# Patient Record
Sex: FEMALE | Race: WHITE | NOT HISPANIC OR LATINO | Employment: FULL TIME | URBAN - NONMETROPOLITAN AREA
[De-identification: names, ages, dates, MRNs, and addresses within clinical notes are randomized per-mention and may not be internally consistent; named-entity substitution may affect disease eponyms.]

---

## 2021-03-19 PROBLEM — R06.00 DYSPNEA: Status: ACTIVE | Noted: 2021-03-19

## 2021-03-19 PROBLEM — R00.0 TACHYCARDIA: Status: ACTIVE | Noted: 2021-03-19

## 2021-03-19 PROBLEM — R00.2 PALPITATIONS: Status: ACTIVE | Noted: 2021-03-19

## 2021-03-19 PROBLEM — R53.83 FATIGUE: Status: ACTIVE | Noted: 2021-03-19

## 2021-03-19 PROBLEM — I10 ESSENTIAL HYPERTENSION: Status: ACTIVE | Noted: 2021-03-19

## 2021-03-19 NOTE — PROGRESS NOTES
OFFICE VISIT  NOTE  [facility] CARDIOLOGY      Name: Zachary Adkins    Date: 3/22/2021  MRN:  [id number]  :  1970      REFERRING/PRIMARY PROVIDER:  Leighton Crowley MD    Chief Complaint   Patient presents with   • Chest Pain     Consult    • Shortness of Breath   • Irregular Heart Beat       HPI: Zachary Adkins is a 50 y.o. female who presents today for new consultation for tachycardia and palpitations.  History of congenital single kidney.  she also reports chest pain shortness of breath.  Palpitations occur daily, usually worse at night when laying down, occasionally associated with dizziness and shortness of breath, somewhat better on bisoprolol.  Also reports chest pain shortness of breath, chest pain worse with exertion, but random, can occur at rest as well, associate with shortness of breath.  She cannot walk as far she used to without becoming short of breath.    Past Medical History:   Diagnosis Date   • Arthritis    • Hypertension    • Kidney disorder     Born with 1 good kidney        History reviewed. No pertinent surgical history.    Social History     Socioeconomic History   • Marital status: Single     Spouse name: Not on file   • Number of children: Not on file   • Years of education: Not on file   • Highest education level: Not on file   Tobacco Use   • Smoking status: Never Smoker   • Smokeless tobacco: Never Used   Vaping Use   • Vaping Use: Never used   Substance and Sexual Activity   • Alcohol use: Not Currently   • Drug use: Never   • Sexual activity: Defer       Family History   Problem Relation Age of Onset   • No Known Problems Mother    • Lung cancer Father         ROS:   Constitutional no fever,  no weight loss   Skin no rash, no subcutaneous nodules   Otolaryngeal no difficulty swallowing   Cardiovascular See HPI   Pulmonary no cough, no sputum production   Gastrointestinal no constipation, no diarrhea   Genitourinary no dysuria, no hematuria   Hematologic no easy  "bruisability, no abnormal bleeding   Musculoskeletal no muscle pain   Neurologic no dizziness, no falls         No Known Allergies      Current Outpatient Medications:   •  Bilberry 150 MG capsule, Take  by mouth Daily., Disp: , Rfl:   •  bisoprolol (ZEBeta) 5 MG tablet, 0.5 tablets Daily., Disp: , Rfl:   •  Flaxseed Oil oil, Daily., Disp: , Rfl:   •  Mily, Zingiber officinalis, (MILY ROOT PO), Take  by mouth Daily., Disp: , Rfl:   •  Omega-3 Fatty Acids (fish oil) 1000 MG capsule capsule, Take 1,000 mg by mouth Daily With Breakfast., Disp: , Rfl:   •  Probiotic Product (PROBIOTIC-10 PO), Take  by mouth Daily., Disp: , Rfl:   •  Turmeric 450 MG capsule, Take 2 tablets by mouth Daily., Disp: , Rfl:     Vitals:    03/22/21 1458   BP: 122/72   BP Location: Left arm   Patient Position: Sitting   Pulse: 74   SpO2: 98%   Weight: 73.5 kg (162 lb)   Height: 152.4 cm (60\")     Body mass index is 31.64 kg/m².    PHYSICAL EXAM:    General Appearance:   · well developed  · well nourished  HENT:   · oropharynx moist  · lips not cyanotic  Neck:  · thyroid not enlarged  · supple  Respiratory:  · no respiratory distress  · normal breath sounds  · no rales  Cardiovascular:  · no jugular venous distention  · regular rhythm  · apical impulse normal  · S1 normal, S2 normal  · no S3, no S4   · no murmur  · no rub, no thrill  · carotid pulses normal; no bruit  · pedal pulses normal  · lower extremity edema: none    Gastrointestinal:   · bowel sounds normal  · non-tender  · no hepatomegaly, no splenomegaly  Musculoskeletal:  · no clubbing of fingers.   · normocephalic, head atraumatic  Skin:   · warm, dry  Psychiatric:  · judgement and insight appropriate  · normal mood and affect    RESULTS:     ECG 12 Lead    Date/Time: 3/22/2021 3:23 PM  Performed by: Jose Luis King MD  Authorized by: Jose Luis King MD   Comparison: not compared with previous ECG   Previous ECG: no previous ECG available  Rhythm: sinus rhythm  Rate: " normal  QRS axis: normal    Clinical impression: non-specific ECG                  Labs:  No results found for: CHOL, TRIG, HDL, LDL, AST, ALT  No results found for: HGBA1C  No components found for: CREATINININE  No results found for: EGFRIFNONA    Most recent PCP note, imaging tests, and labs reviewed.    ASSESSMENT:  Problem List Items Addressed This Visit        Cardiac and Vasculature    Tachycardia - Primary    Palpitations    Essential hypertension    Relevant Medications    bisoprolol (ZEBeta) 5 MG tablet    Precordial chest pain       Symptoms and Signs    Dyspnea    Fatigue           PLAN:    1.  Chest pain:  Proceed with treadmill exercise stress test.   The patient was advised to call 911 if chest pain worsens or persist despite nitroglycerin or rest.    2.  Palpitations:  14 day holter to be sent to pt's home.  Decrease caffeine, increase water, increase exercise.    3.  Dyspnea  Check echo and gxt.      Return to clinic in 6 months, or sooner as needed.    Thank you for the opportunity to share in the care of your patient; please do not hesitate to call me with any questions.     Jose Luis King MD, [facility]  Office: [phone] [address] [place] [address]    03/22/21

## 2021-03-22 ENCOUNTER — CONSULT (OUTPATIENT)
Dept: CARDIOLOGY | Facility: CLINIC | Age: 51
End: 2021-03-22

## 2021-03-22 VITALS
HEART RATE: 74 BPM | WEIGHT: 162 LBS | DIASTOLIC BLOOD PRESSURE: 72 MMHG | BODY MASS INDEX: 31.8 KG/M2 | SYSTOLIC BLOOD PRESSURE: 122 MMHG | HEIGHT: 60 IN | OXYGEN SATURATION: 98 %

## 2021-03-22 DIAGNOSIS — R00.2 PALPITATIONS: ICD-10-CM

## 2021-03-22 DIAGNOSIS — R00.0 TACHYCARDIA: Primary | ICD-10-CM

## 2021-03-22 DIAGNOSIS — I10 ESSENTIAL HYPERTENSION: ICD-10-CM

## 2021-03-22 DIAGNOSIS — R06.00 DYSPNEA, UNSPECIFIED TYPE: ICD-10-CM

## 2021-03-22 DIAGNOSIS — R07.2 PRECORDIAL CHEST PAIN: ICD-10-CM

## 2021-03-22 DIAGNOSIS — R53.83 OTHER FATIGUE: ICD-10-CM

## 2021-03-22 PROCEDURE — 99204 OFFICE O/P NEW MOD 45 MIN: CPT | Performed by: INTERNAL MEDICINE

## 2021-03-22 PROCEDURE — 93000 ELECTROCARDIOGRAM COMPLETE: CPT | Performed by: INTERNAL MEDICINE

## 2021-03-22 RX ORDER — CHLORAL HYDRATE 500 MG
1000 CAPSULE ORAL
COMMUNITY
End: 2023-04-05

## 2021-03-22 RX ORDER — BISOPROLOL FUMARATE 5 MG/1
0.5 TABLET, FILM COATED ORAL DAILY
COMMUNITY
Start: 2021-02-24 | End: 2022-05-03

## 2021-03-22 RX ORDER — BILBERRY 150 MG
CAPSULE ORAL DAILY
COMMUNITY
End: 2023-04-05

## 2021-04-07 ENCOUNTER — TELEPHONE (OUTPATIENT)
Dept: CARDIOLOGY | Facility: CLINIC | Age: 51
End: 2021-04-07

## 2021-04-07 NOTE — TELEPHONE ENCOUNTER
Jaye called to inform us that pt declined to proceed with heart monitor. Attempted to reach pt, no answer, no VM.

## 2022-05-03 RX ORDER — CYCLOBENZAPRINE HCL 10 MG
TABLET ORAL
Qty: 30 TABLET | Refills: 1 | Status: SHIPPED | OUTPATIENT
Start: 2022-05-03 | End: 2022-06-27 | Stop reason: SDUPTHER

## 2022-05-03 RX ORDER — BISOPROLOL FUMARATE 5 MG/1
TABLET, FILM COATED ORAL
Qty: 30 TABLET | Refills: 1 | Status: SHIPPED | OUTPATIENT
Start: 2022-05-03 | End: 2022-06-27 | Stop reason: SDUPTHER

## 2022-05-03 NOTE — TELEPHONE ENCOUNTER
Sending in enough meds to hold her over to her appointment with dr. Goodwin. Patient's last appointment was in dec for acute visit, prior to dec, last appointment in feb 2021

## 2022-06-27 ENCOUNTER — OFFICE VISIT (OUTPATIENT)
Dept: FAMILY MEDICINE CLINIC | Facility: CLINIC | Age: 52
End: 2022-06-27

## 2022-06-27 VITALS
HEIGHT: 60 IN | OXYGEN SATURATION: 99 % | DIASTOLIC BLOOD PRESSURE: 80 MMHG | HEART RATE: 68 BPM | WEIGHT: 170 LBS | SYSTOLIC BLOOD PRESSURE: 128 MMHG | BODY MASS INDEX: 33.38 KG/M2

## 2022-06-27 DIAGNOSIS — Z00.00 ROUTINE GENERAL MEDICAL EXAMINATION AT A HEALTH CARE FACILITY: Primary | ICD-10-CM

## 2022-06-27 DIAGNOSIS — E55.9 VITAMIN D DEFICIENCY: ICD-10-CM

## 2022-06-27 DIAGNOSIS — R51.9 FREQUENT HEADACHES: ICD-10-CM

## 2022-06-27 DIAGNOSIS — I10 ESSENTIAL HYPERTENSION: ICD-10-CM

## 2022-06-27 PROCEDURE — 36415 COLL VENOUS BLD VENIPUNCTURE: CPT | Performed by: FAMILY MEDICINE

## 2022-06-27 PROCEDURE — 99386 PREV VISIT NEW AGE 40-64: CPT | Performed by: FAMILY MEDICINE

## 2022-06-27 RX ORDER — CYCLOBENZAPRINE HCL 10 MG
10 TABLET ORAL NIGHTLY
Qty: 30 TABLET | Refills: 5 | Status: SHIPPED | OUTPATIENT
Start: 2022-06-27

## 2022-06-27 RX ORDER — BISOPROLOL FUMARATE 5 MG/1
5 TABLET, FILM COATED ORAL DAILY
Qty: 30 TABLET | Refills: 5 | Status: SHIPPED | OUTPATIENT
Start: 2022-06-27

## 2022-06-27 NOTE — PROGRESS NOTES
Female Physical Note      Date: 2022   Patient Name: Zachary Adkins  : 1970   MRN: [id number]     Chief Complaint:    Chief Complaint   Patient presents with   • Annual Exam       History of Present Illness: Zachary Adkins is a 51 y.o. female who is here today for their annual health maintenance and physical.  Overall patient feeling well.  No significant complaints.  Appropriate health maintenance was discussed.  Medications were reviewed.  Medications stable.        Subjective      Review of Systems:   Review of Systems   Constitutional: Negative for fatigue and fever.   HENT: Negative for congestion and ear pain.    Respiratory: Negative for apnea, cough, chest tightness and shortness of breath.    Cardiovascular: Negative for chest pain.   Gastrointestinal: Negative for abdominal pain, constipation, diarrhea and nausea.   Musculoskeletal: Negative for arthralgias.   Psychiatric/Behavioral: Negative for depressed mood and stress.       Past Medical History:   Past Medical History:   Diagnosis Date   • Arthritis    • Breast lump     RIGHT   • Common migraine    • Elevated blood pressure reading    • Fracture     RT 5TH [facility] FRX   • Hypertension    • Kidney disorder     Born with 1 good kidney        Past Surgical History:   Past Surgical History:   Procedure Laterality Date   • BREAST BIOPSY Right    • ORIF FINGER FRACTURE     • TUBAL ABDOMINAL LIGATION Bilateral        Family History:   Family History   Problem Relation Age of Onset   • Cancer Mother    • Lung cancer Father    • Allergies Brother    • Lupus Paternal Grandmother 65   • Diabetes Other    • Depression Other    • Cancer Other    • Migraines Other    • Eczema Other    • Allergies Other        Social History:   Social History     Socioeconomic History   • Marital status: Single   Tobacco Use   • Smoking status: Never Smoker   • Smokeless tobacco: Never Used   Vaping Use   • Vaping Use: Never used   Substance and Sexual Activity   •  "Alcohol use: Not Currently   • Drug use: Never   • Sexual activity: Defer       Medications:     Current Outpatient Medications:   •  bisoprolol (ZEBeta) 5 MG tablet, Take 1 tablet by mouth Daily., Disp: 30 tablet, Rfl: 5  •  cyclobenzaprine (FLEXERIL) 10 MG tablet, Take 1 tablet by mouth Every Night., Disp: 30 tablet, Rfl: 5  •  Bilberry 150 MG capsule, Take  by mouth Daily., Disp: , Rfl:   •  Flaxseed Oil oil, Daily., Disp: , Rfl:   •  Mily, Zingiber officinalis, (MILY ROOT PO), Take  by mouth Daily., Disp: , Rfl:   •  Omega-3 Fatty Acids (fish oil) 1000 MG capsule capsule, Take 1,000 mg by mouth Daily With Breakfast., Disp: , Rfl:   •  Probiotic Product (PROBIOTIC-10 PO), Take  by mouth Daily., Disp: , Rfl:   •  Turmeric 450 MG capsule, Take 2 tablets by mouth Daily., Disp: , Rfl:     Allergies:   No Known Allergies      There is no immunization history on file for this patient.   Colorectal Screening:     Last Completed Colonoscopy     This patient has no relevant Health Maintenance data.        Pap:    Last Completed Pap Smear     This patient has no relevant Health Maintenance data.         Mammogram:    Last Completed Mammogram     This patient has no relevant Health Maintenance data.               Diet/Physical activity: Appropriate diet and physical activity discussed.    Depression: PHQ-2 Depression Screening  Little interest or pleasure in doing things? 0-->not at all   Feeling down, depressed, or hopeless? 0-->not at all   PHQ-2 Total Score 0       Objective     Physical Exam:  Vital Signs:   Vitals:    06/27/22 1436   BP: 128/80   Pulse: 68   SpO2: 99%   Weight: 77.1 kg (170 lb)   Height: 152.4 cm (60\")     Body mass index is 33.2 kg/m².     Physical Exam  Vitals and nursing note reviewed.   Constitutional:       General: She is not in acute distress.     Appearance: Normal appearance. She is not ill-appearing.   HENT:      Head: Normocephalic and atraumatic.      Right Ear: Tympanic membrane and " ear canal normal.      Left Ear: Tympanic membrane and ear canal normal.      Nose: Nose normal.   Cardiovascular:      Rate and Rhythm: Normal rate and regular rhythm.      Heart sounds: Normal heart sounds.   Pulmonary:      Effort: Pulmonary effort is normal.      Breath sounds: Normal breath sounds.   Neurological:      Mental Status: She is alert and oriented to person, place, and time. Mental status is at baseline.   Psychiatric:         Mood and Affect: Mood normal.         Procedures    Measurements:     BMI is >= 30 and <35. (Class 1 Obesity). The following options were offered after discussion;: nutrition counseling/recommendations     Assessment / Plan      Assessment/Plan:   Diagnoses and all orders for this visit:    1. Routine general medical examination at a health care facility (Primary)  -     CBC Auto Differential; Future  -     Comprehensive Metabolic Panel; Future  -     Lipid Panel; Future  -     TSH; Future  -     Vitamin D 25 Hydroxy; Future  -     CBC Auto Differential  -     Comprehensive Metabolic Panel  -     Lipid Panel  -     TSH  -     Vitamin D 25 Hydroxy    2. Vitamin D deficiency  -     Vitamin D 25 Hydroxy; Future  -     Vitamin D 25 Hydroxy    3. Essential hypertension    4. Frequent headaches    Other orders  -     cyclobenzaprine (FLEXERIL) 10 MG tablet; Take 1 tablet by mouth Every Night.  Dispense: 30 tablet; Refill: 5  -     bisoprolol (ZEBeta) 5 MG tablet; Take 1 tablet by mouth Daily.  Dispense: 30 tablet; Refill: 5         Medication refills given.  Physical exam unremarkable.  Blood pressure controlled.  Patient will schedule appointment on her own for appointment with gynecology for Pap smear.  She will also schedule her mammogram.  Not due for colon cancer screening yet.    Follow Up:   No follow-ups on file.    Healthcare Maintenance:   Counseling provided on appropriate diet and physical activity..   Zachary Adkins voices understanding and acceptance of this advice and will  call back with any further questions or concerns. AVS with preventive healthcare tips printed for patient.     Delon Goodwin MD  [facility]

## 2022-06-28 LAB
25(OH)D3+25(OH)D2 SERPL-MCNC: 31.4 NG/ML (ref 30–100)
ALBUMIN SERPL-MCNC: 4.3 G/DL (ref 3.8–4.8)
ALBUMIN/GLOB SERPL: 1.5 {RATIO} (ref 1.2–2.2)
ALP SERPL-CCNC: 87 IU/L (ref 44–121)
ALT SERPL-CCNC: 18 IU/L (ref 0–32)
AST SERPL-CCNC: 16 IU/L (ref 0–40)
BASOPHILS # BLD AUTO: 0.1 X10E3/UL (ref 0–0.2)
BASOPHILS NFR BLD AUTO: 1 %
BILIRUB SERPL-MCNC: 0.2 MG/DL (ref 0–1.2)
BUN SERPL-MCNC: 8 MG/DL (ref 6–24)
BUN/CREAT SERPL: 9 (ref 9–23)
CALCIUM SERPL-MCNC: 9.1 MG/DL (ref 8.7–10.2)
CHLORIDE SERPL-SCNC: 103 MMOL/L (ref 96–106)
CHOLEST SERPL-MCNC: 207 MG/DL (ref 100–199)
CO2 SERPL-SCNC: 23 MMOL/L (ref 20–29)
CREAT SERPL-MCNC: 0.91 MG/DL (ref 0.57–1)
EGFRCR SERPLBLD CKD-EPI 2021: 77 ML/MIN/1.73
EOSINOPHIL # BLD AUTO: 0.4 X10E3/UL (ref 0–0.4)
EOSINOPHIL NFR BLD AUTO: 3 %
ERYTHROCYTE [DISTWIDTH] IN BLOOD BY AUTOMATED COUNT: 14.3 % (ref 11.7–15.4)
GLOBULIN SER CALC-MCNC: 2.9 G/DL (ref 1.5–4.5)
GLUCOSE SERPL-MCNC: 96 MG/DL (ref 65–99)
HCT VFR BLD AUTO: 40.4 % (ref 34–46.6)
HDLC SERPL-MCNC: 31 MG/DL
HGB BLD-MCNC: 12.8 G/DL (ref 11.1–15.9)
IMM GRANULOCYTES # BLD AUTO: 0 X10E3/UL (ref 0–0.1)
IMM GRANULOCYTES NFR BLD AUTO: 0 %
LDLC SERPL CALC-MCNC: 133 MG/DL (ref 0–99)
LYMPHOCYTES # BLD AUTO: 3.5 X10E3/UL (ref 0.7–3.1)
LYMPHOCYTES NFR BLD AUTO: 25 %
MCH RBC QN AUTO: 25.5 PG (ref 26.6–33)
MCHC RBC AUTO-ENTMCNC: 31.7 G/DL (ref 31.5–35.7)
MCV RBC AUTO: 81 FL (ref 79–97)
MONOCYTES # BLD AUTO: 0.8 X10E3/UL (ref 0.1–0.9)
MONOCYTES NFR BLD AUTO: 6 %
NEUTROPHILS # BLD AUTO: 8.9 X10E3/UL (ref 1.4–7)
NEUTROPHILS NFR BLD AUTO: 65 %
PLATELET # BLD AUTO: 422 X10E3/UL (ref 150–450)
POTASSIUM SERPL-SCNC: 4.4 MMOL/L (ref 3.5–5.2)
PROT SERPL-MCNC: 7.2 G/DL (ref 6–8.5)
RBC # BLD AUTO: 5.02 X10E6/UL (ref 3.77–5.28)
SODIUM SERPL-SCNC: 139 MMOL/L (ref 134–144)
TRIGL SERPL-MCNC: 238 MG/DL (ref 0–149)
TSH SERPL DL<=0.005 MIU/L-ACNC: 2.84 UIU/ML (ref 0.45–4.5)
VLDLC SERPL CALC-MCNC: 43 MG/DL (ref 5–40)
WBC # BLD AUTO: 13.7 X10E3/UL (ref 3.4–10.8)

## 2022-07-25 ENCOUNTER — TELEPHONE (OUTPATIENT)
Dept: FAMILY MEDICINE CLINIC | Facility: CLINIC | Age: 52
End: 2022-07-25

## 2022-07-25 NOTE — TELEPHONE ENCOUNTER
Caller: Zachary Adkins    Relationship: Self    Best call back number: [phone] A DETAILED VOICEMAIL IS ACCEPTABLE TO LEAVE.    Caller requesting test results: LAB WORK FROM 6.27.22    Additional notes:    PATIENT STATES SHE IS HAVING NEW SPELLS WITH SYMPTOMS INCLUDING SEVERE LOSS OF ENERGY, VOMITING, INCONTINENCY, LEFT SIDE DRAWING UP WITH LOSS OF BODY CONTROL, UNABLE TO OPEN HAND, TENDER SPOT ON BACK OF HEAD AFTER SPELLS. PATIENT STATES THERE IS  NO CONSISTENCY AS TO WHAT COULD BE CAUSING HER SPELLS. PATIENT STATES THE SPELLS START WITH A COLD FLUSH AND SOMETIMES A SHARP PAIN IN THE SIDE.PATIENT STATES ONE DAY SHE WAS UNABLE TO KEEP GRASP OF HER STEERING WHEEL DURING A SPELL SHE HAD ON 7.12.22 AND HAD TO PULL OVER TO THE SIDE OF THE ROAD. PATIENT STATES IT TOOK THIRTY MINUTES BEFORE SHE HAD THE ENERGY TO HOLD HER PHONE AND CONTACT SOMEONE. PATIENT ALSO STATES SHE REMAINS CONSCIOUS DURING HER SPELLS.     PATIENT STATES SHE IS INTERESTED IN POSSIBLE REFERRALS TO NEUROLOGY OR OTHER DOCTORS FOR FURTHER TESTING, INCLUDING POSSIBILY A MRI.     PLEASE CALL BACK TO [facility] AND GO OVER PATIENT'S LAB WORK.   PATIENT PREFERS TO SPEAK TO DR. RAYMUNDO TO DISCUSS WHAT STEPS SHE NEEDS TO TAKE GOING FORWARD.    THANK YOU.

## 2022-07-26 NOTE — TELEPHONE ENCOUNTER
When I saw patient, it was for a general physical exam and everything was completely normal and she had no complaints.  Her blood work did not show any significant abnormalities to cause her current symptoms.  We really would not be able to make any referrals or do any evaluation until she is seen in the office by 1 of us.  If she has that type of an episode again she would need to go to the emergency room for further evaluation more quickly.  Otherwise she needs to be seen in the office.

## 2022-07-28 ENCOUNTER — OFFICE VISIT (OUTPATIENT)
Dept: FAMILY MEDICINE CLINIC | Facility: CLINIC | Age: 52
End: 2022-07-28

## 2022-07-28 VITALS
HEART RATE: 84 BPM | WEIGHT: 170 LBS | SYSTOLIC BLOOD PRESSURE: 140 MMHG | BODY MASS INDEX: 33.38 KG/M2 | DIASTOLIC BLOOD PRESSURE: 84 MMHG | HEIGHT: 60 IN | OXYGEN SATURATION: 98 %

## 2022-07-28 DIAGNOSIS — R55 NEAR SYNCOPE: Primary | ICD-10-CM

## 2022-07-28 PROCEDURE — 99214 OFFICE O/P EST MOD 30 MIN: CPT | Performed by: FAMILY MEDICINE

## 2022-07-31 NOTE — PROGRESS NOTES
Follow Up Office Visit      Date of Visit:  2022   Patient Name: Zachary Adkins  : 1970   MRN: [id number]     Chief Complaint:  No chief complaint on file.      History of Present Illness: Zachary Adkins is a 52 y.o. female who is here today for follow up.  Patient had syncopal episode at home.  Describes a flushed sensation and sweating.  Also nausea and diarrhea.  No loss of awareness.        Subjective      Review of Systems:   Review of Systems   Constitutional: Negative for fatigue and fever.   HENT: Negative for congestion and ear pain.    Respiratory: Negative for apnea, cough, chest tightness and shortness of breath.    Cardiovascular: Negative for chest pain.   Gastrointestinal: Negative for abdominal pain, constipation, diarrhea and nausea.   Musculoskeletal: Negative for arthralgias.   Neurological: Positive for syncope.   Psychiatric/Behavioral: Negative for depressed mood and stress.       Past Medical History:   Past Medical History:   Diagnosis Date   • Arthritis    • Breast lump     RIGHT   • Common migraine    • Elevated blood pressure reading    • Fracture     RT 5TH [facility] FRX   • Hypertension    • Kidney disorder     Born with 1 good kidney        Past Surgical History:   Past Surgical History:   Procedure Laterality Date   • BREAST BIOPSY Right    • ORIF FINGER FRACTURE     • TUBAL ABDOMINAL LIGATION Bilateral        Family History:   Family History   Problem Relation Age of Onset   • Cancer Mother    • Lung cancer Father    • Allergies Brother    • Lupus Paternal Grandmother 65   • Diabetes Other    • Depression Other    • Cancer Other    • Migraines Other    • Eczema Other    • Allergies Other        Social History:   Social History     Socioeconomic History   • Marital status: Single   Tobacco Use   • Smoking status: Never Smoker   • Smokeless tobacco: Never Used   Vaping Use   • Vaping Use: Never used   Substance and Sexual Activity   • Alcohol use: Not Currently   • Drug use:  "Never   • Sexual activity: Defer       Medications:     Current Outpatient Medications:   •  Bilberry 150 MG capsule, Take  by mouth Daily., Disp: , Rfl:   •  bisoprolol (ZEBeta) 5 MG tablet, Take 1 tablet by mouth Daily., Disp: 30 tablet, Rfl: 5  •  cyclobenzaprine (FLEXERIL) 10 MG tablet, Take 1 tablet by mouth Every Night., Disp: 30 tablet, Rfl: 5  •  Flaxseed Oil oil, Daily., Disp: , Rfl:   •  Mily, Zingiber officinalis, (MILY ROOT PO), Take  by mouth Daily., Disp: , Rfl:   •  Omega-3 Fatty Acids (fish oil) 1000 MG capsule capsule, Take 1,000 mg by mouth Daily With Breakfast., Disp: , Rfl:   •  Probiotic Product (PROBIOTIC-10 PO), Take  by mouth Daily., Disp: , Rfl:   •  Turmeric 450 MG capsule, Take 2 tablets by mouth Daily., Disp: , Rfl:     Allergies:   No Known Allergies    Objective     Physical Exam:  Vital Signs:   Vitals:    07/28/22 1530   BP: 140/84   Pulse: 84   SpO2: 98%   Weight: 77.1 kg (170 lb)   Height: 152.4 cm (60\")     Body mass index is 33.2 kg/m².     Physical Exam  Vitals and nursing note reviewed.   Constitutional:       General: She is not in acute distress.     Appearance: Normal appearance. She is not ill-appearing.   HENT:      Head: Normocephalic and atraumatic.      Right Ear: Tympanic membrane and ear canal normal.      Left Ear: Tympanic membrane and ear canal normal.      Nose: Nose normal.   Cardiovascular:      Rate and Rhythm: Normal rate and regular rhythm.      Heart sounds: Normal heart sounds.   Pulmonary:      Effort: Pulmonary effort is normal.      Breath sounds: Normal breath sounds.   Neurological:      Mental Status: She is alert and oriented to person, place, and time. Mental status is at baseline.   Psychiatric:         Mood and Affect: Mood normal.         Procedures      Assessment / Plan      Assessment/Plan:   Diagnoses and all orders for this visit:    1. Near syncope (Primary)  -     Ambulatory Referral to Cardiology  -     Ambulatory Referral to " Neurology         Sounds like possibly the patient either has neurocardiogenic syncope or vasovagal syncope.  Making referrals for further evaluation.  Patient had been here previously in the last few weeks and physical exam revealed no abnormalities with her fasting labs.  No further labs done today.    Follow Up:   No follow-ups on file.    Delon Goodwin  [facility] [place] [facility] [employer]

## 2022-08-05 ENCOUNTER — TELEPHONE (OUTPATIENT)
Dept: FAMILY MEDICINE CLINIC | Facility: CLINIC | Age: 52
End: 2022-08-05

## 2022-08-05 NOTE — TELEPHONE ENCOUNTER
Caller: Zachary Adkins    Relationship: Self    Best call back number: [phone]    What is the best time to reach you: ANY TIME    Who are you requesting to speak with (clinical staff, provider,  specific staff member): NURSE    Do you know the name of the person who called: SELF    What was the call regarding: CARDIOLOGY HAS YET TO RECEIVE REFERRAL. PLEASE RE-FAX -[phone]. PLEASE CALL PATIENT WHEN THIS HAS BEEN SENT AGAIN.    Do you require a callback: YES

## 2022-08-09 ENCOUNTER — LAB (OUTPATIENT)
Dept: LAB | Facility: HOSPITAL | Age: 52
End: 2022-08-09

## 2022-08-09 ENCOUNTER — OFFICE VISIT (OUTPATIENT)
Dept: NEUROLOGY | Facility: CLINIC | Age: 52
End: 2022-08-09

## 2022-08-09 VITALS
BODY MASS INDEX: 33.18 KG/M2 | OXYGEN SATURATION: 98 % | DIASTOLIC BLOOD PRESSURE: 80 MMHG | HEART RATE: 86 BPM | TEMPERATURE: 98 F | SYSTOLIC BLOOD PRESSURE: 122 MMHG | HEIGHT: 60 IN | WEIGHT: 169 LBS

## 2022-08-09 DIAGNOSIS — R56.9 SEIZURE-LIKE ACTIVITY: ICD-10-CM

## 2022-08-09 DIAGNOSIS — R47.9 TRANSIENT SPEECH DISTURBANCE: ICD-10-CM

## 2022-08-09 DIAGNOSIS — R55 NEAR SYNCOPE: ICD-10-CM

## 2022-08-09 DIAGNOSIS — R40.4 TRANSIENT ALTERATION OF AWARENESS: Primary | ICD-10-CM

## 2022-08-09 PROCEDURE — 80048 BASIC METABOLIC PNL TOTAL CA: CPT

## 2022-08-09 PROCEDURE — 99204 OFFICE O/P NEW MOD 45 MIN: CPT | Performed by: NURSE PRACTITIONER

## 2022-08-09 PROCEDURE — 36415 COLL VENOUS BLD VENIPUNCTURE: CPT
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
## 2022-08-10 LAB
ANION GAP SERPL CALCULATED.3IONS-SCNC: 12.3 MMOL/L (ref 5–15)
BUN SERPL-MCNC: 7 MG/DL (ref 6–20)
BUN/CREAT SERPL: 7.8 (ref 7–25)
CALCIUM SPEC-SCNC: 8.7 MG/DL (ref 8.6–10.5)
CHLORIDE SERPL-SCNC: 103 MMOL/L (ref 98–107)
CO2 SERPL-SCNC: 24.7 MMOL/L (ref 22–29)
CREAT SERPL-MCNC: 0.9 MG/DL (ref 0.57–1)
EGFRCR SERPLBLD CKD-EPI 2021: 78 ML/MIN/1.73
GLUCOSE SERPL-MCNC: 71 MG/DL (ref 65–99)
POTASSIUM SERPL-SCNC: 4 MMOL/L (ref 3.5–5.2)
SODIUM SERPL-SCNC: 140 MMOL/L (ref 136–145)

## 2022-08-18 ENCOUNTER — PATIENT ROUNDING (BHMG ONLY) (OUTPATIENT)
Dept: NEUROLOGY | Facility: CLINIC | Age: 52
End: 2022-08-18

## 2022-08-18 NOTE — PROGRESS NOTES
August 18, 2022    Hello, may I speak with Zachary Adkins?    My name is tone     I am  with [facility]  [address] [address] [phone].    Before we get started may I verify your date of birth? 1970    I am calling to officially welcome you to our practice and ask about your recent visit. Is this a good time to talk?  Patient rounding sent through [employer].

## 2022-10-12 ENCOUNTER — HOSPITAL ENCOUNTER (OUTPATIENT)
Dept: MRI IMAGING | Facility: HOSPITAL | Age: 52
Discharge: HOME | End: 2022-10-12

## 2022-10-12 ENCOUNTER — HOSPITAL ENCOUNTER (OUTPATIENT)
Dept: CARDIOLOGY | Facility: HOSPITAL | Age: 52
Discharge: HOME | End: 2022-10-12

## 2022-10-12 VITALS — WEIGHT: 170 LBS | BODY MASS INDEX: 33.38 KG/M2 | HEIGHT: 60 IN

## 2022-10-12 DIAGNOSIS — R40.4 TRANSIENT ALTERATION OF AWARENESS: ICD-10-CM

## 2022-10-12 DIAGNOSIS — R56.9 SEIZURE-LIKE ACTIVITY: ICD-10-CM

## 2022-10-12 DIAGNOSIS — R47.9 TRANSIENT SPEECH DISTURBANCE: ICD-10-CM

## 2022-10-12 PROCEDURE — A9577 INJ MULTIHANCE: HCPCS | Performed by: NURSE PRACTITIONER

## 2022-10-12 PROCEDURE — 93880 EXTRACRANIAL BILAT STUDY: CPT | Performed by: INTERNAL MEDICINE

## 2022-10-12 PROCEDURE — 0 GADOBENATE DIMEGLUMINE 529 MG/ML SOLUTION: Performed by: NURSE PRACTITIONER

## 2022-10-12 PROCEDURE — 93880 EXTRACRANIAL BILAT STUDY: CPT

## 2022-10-12 PROCEDURE — 70553 MRI BRAIN STEM W/O & W/DYE: CPT

## 2022-10-12 RX ADMIN — GADOBENATE DIMEGLUMINE 15 ML: 529 INJECTION, SOLUTION INTRAVENOUS at 14:44

## 2022-10-13 ENCOUNTER — TELEPHONE (OUTPATIENT)
Dept: NEUROLOGY | Facility: CLINIC | Age: 52
End: 2022-10-13

## 2022-10-13 LAB
BH CV XLRA MEAS LEFT DIST CCA EDV: 32.9 CM/SEC
BH CV XLRA MEAS LEFT DIST CCA PSV: 88.2 CM/SEC
BH CV XLRA MEAS LEFT DIST ICA EDV: 37.1 CM/SEC
BH CV XLRA MEAS LEFT DIST ICA PSV: 97.4 CM/SEC
BH CV XLRA MEAS LEFT ICA/CCA RATIO: 0.94
BH CV XLRA MEAS LEFT MID CCA EDV: 33.5 CM/SEC
BH CV XLRA MEAS LEFT MID CCA PSV: 103 CM/SEC
BH CV XLRA MEAS LEFT MID ICA EDV: 37.3 CM/SEC
BH CV XLRA MEAS LEFT MID ICA PSV: 96.6 CM/SEC
BH CV XLRA MEAS LEFT PROX CCA EDV: 28 CM/SEC
BH CV XLRA MEAS LEFT PROX CCA PSV: 90.1 CM/SEC
BH CV XLRA MEAS LEFT PROX ECA EDV: 18.2 CM/SEC
BH CV XLRA MEAS LEFT PROX ECA PSV: 70.8 CM/SEC
BH CV XLRA MEAS LEFT PROX ICA EDV: 26.9 CM/SEC
BH CV XLRA MEAS LEFT PROX ICA PSV: 77.9 CM/SEC
BH CV XLRA MEAS LEFT PROX SCLA PSV: 114 CM/SEC
BH CV XLRA MEAS LEFT VERTEBRAL A EDV: 28.5 CM/SEC
BH CV XLRA MEAS LEFT VERTEBRAL A PSV: 67.2 CM/SEC
BH CV XLRA MEAS RIGHT DIST CCA EDV: 29.8 CM/SEC
BH CV XLRA MEAS RIGHT DIST CCA PSV: 81.4 CM/SEC
BH CV XLRA MEAS RIGHT DIST ICA EDV: 39.9 CM/SEC
BH CV XLRA MEAS RIGHT DIST ICA PSV: 117 CM/SEC
BH CV XLRA MEAS RIGHT ICA/CCA RATIO: 0.84
BH CV XLRA MEAS RIGHT MID CCA EDV: 28 CM/SEC
BH CV XLRA MEAS RIGHT MID CCA PSV: 93.2 CM/SEC
BH CV XLRA MEAS RIGHT MID ICA EDV: 29 CM/SEC
BH CV XLRA MEAS RIGHT MID ICA PSV: 78.1 CM/SEC
BH CV XLRA MEAS RIGHT PROX CCA EDV: 23 CM/SEC
BH CV XLRA MEAS RIGHT PROX CCA PSV: 85.1 CM/SEC
BH CV XLRA MEAS RIGHT PROX ECA EDV: 16.7 CM/SEC
BH CV XLRA MEAS RIGHT PROX ECA PSV: 74.7 CM/SEC
BH CV XLRA MEAS RIGHT PROX ICA EDV: 21.1 CM/SEC
BH CV XLRA MEAS RIGHT PROX ICA PSV: 77.6 CM/SEC
BH CV XLRA MEAS RIGHT PROX SCLA PSV: 67.7 CM/SEC
BH CV XLRA MEAS RIGHT VERTEBRAL A EDV: 19.2 CM/SEC
BH CV XLRA MEAS RIGHT VERTEBRAL A PSV: 76.2 CM/SEC
MAXIMAL PREDICTED HEART RATE: 170 BPM
STRESS TARGET HR: 145 BPM

## 2022-10-13 NOTE — PROGRESS NOTES
Please notify patient MRI of the brain is essentially normal for age.  No evidence of strokes or significant abnormalities.  She has a few tiny typical aging changes.  She has no concerning findings.  Recommend she follow-up in clinic as scheduled.  Thanks, MICHAEL Torres.

## 2022-10-13 NOTE — TELEPHONE ENCOUNTER
----- Message from MICHAEL Zaragoza sent at 10/13/2022  1:01 PM EDT -----  Please notify the patient that her carotid ultrasound looking at the arteries and blood flow through the neck are completely normal.  She should follow-up as scheduled in clinic.  Thanks, MICHAEL Torres.

## 2022-10-13 NOTE — PROGRESS NOTES
Please notify the patient that her carotid ultrasound looking at the arteries and blood flow through the neck are completely normal.  She should follow-up as scheduled in clinic.  Thanks, MICHAEL Torres.

## 2022-10-14 ENCOUNTER — TELEPHONE (OUTPATIENT)
Dept: NEUROLOGY | Facility: CLINIC | Age: 52
End: 2022-10-14

## 2022-10-14 NOTE — TELEPHONE ENCOUNTER
----- Message from MICHAEL Zaragoza sent at 10/13/2022  5:07 PM EDT -----  Please notify patient MRI of the brain is essentially normal for age.  No evidence of strokes or significant abnormalities.  She has a few tiny typical aging changes.  She has no concerning findings.  Recommend she follow-up in clinic as scheduled.  Thanks, MICHAEL Torres.

## 2022-10-17 ENCOUNTER — ANCILLARY ORDERS (OUTPATIENT)
Dept: NEUROLOGY | Facility: CLINIC | Age: 52
End: 2022-10-17

## 2022-10-17 ENCOUNTER — HOSPITAL ENCOUNTER (OUTPATIENT)
Dept: NEUROLOGY | Facility: HOSPITAL | Age: 52
Discharge: HOME | End: 2022-10-17
Admitting: NURSE PRACTITIONER

## 2022-10-17 DIAGNOSIS — R47.9 TRANSIENT SPEECH DISTURBANCE: ICD-10-CM

## 2022-10-17 DIAGNOSIS — R40.4 TRANSIENT ALTERATION OF AWARENESS: ICD-10-CM

## 2022-10-17 DIAGNOSIS — R56.9 SEIZURE-LIKE ACTIVITY: ICD-10-CM

## 2022-10-17 PROCEDURE — 95813 EEG EXTND MNTR 61-119 MIN: CPT

## 2022-10-20 ENCOUNTER — TELEPHONE (OUTPATIENT)
Dept: NEUROLOGY | Facility: CLINIC | Age: 52
End: 2022-10-20

## 2022-10-20 NOTE — PROGRESS NOTES
Please notify the patient that her EEG is normal showing no evidence of active seizure focus.  She should follow-up as scheduled in clinic.  Thanks, MICHAEL Torres.

## 2022-10-20 NOTE — TELEPHONE ENCOUNTER
----- Message from MICHAEL Zaragoza sent at 10/20/2022  1:15 PM EDT -----  Please notify the patient that her EEG is normal showing no evidence of active seizure focus.  She should follow-up as scheduled in clinic.  Thanks, MICHAEL Torrse.

## 2023-04-05 ENCOUNTER — OFFICE VISIT (OUTPATIENT)
Dept: FAMILY MEDICINE CLINIC | Facility: CLINIC | Age: 53
End: 2023-04-05
Payer: COMMERCIAL

## 2023-04-05 VITALS
RESPIRATION RATE: 17 BRPM | BODY MASS INDEX: 35.15 KG/M2 | HEIGHT: 60 IN | DIASTOLIC BLOOD PRESSURE: 84 MMHG | SYSTOLIC BLOOD PRESSURE: 130 MMHG | HEART RATE: 70 BPM | WEIGHT: 179.06 LBS | OXYGEN SATURATION: 99 %

## 2023-04-05 DIAGNOSIS — M19.041 LOCALIZED OSTEOARTHRITIS OF HANDS, BILATERAL: ICD-10-CM

## 2023-04-05 DIAGNOSIS — I10 ESSENTIAL HYPERTENSION: Primary | ICD-10-CM

## 2023-04-05 DIAGNOSIS — F32.9 REACTIVE DEPRESSION: ICD-10-CM

## 2023-04-05 DIAGNOSIS — D53.1 MEGALOBLASTIC ANEMIA: ICD-10-CM

## 2023-04-05 DIAGNOSIS — M19.042 LOCALIZED OSTEOARTHRITIS OF HANDS, BILATERAL: ICD-10-CM

## 2023-04-05 DIAGNOSIS — E78.2 MIXED HYPERLIPIDEMIA: ICD-10-CM

## 2023-04-05 DIAGNOSIS — R53.83 OTHER FATIGUE: ICD-10-CM

## 2023-04-05 DIAGNOSIS — E87.6 HYPOKALEMIA: ICD-10-CM

## 2023-04-05 DIAGNOSIS — J01.00 ACUTE MAXILLARY SINUSITIS, RECURRENCE NOT SPECIFIED: ICD-10-CM

## 2023-04-05 PROCEDURE — 36415 COLL VENOUS BLD VENIPUNCTURE: CPT | Performed by: FAMILY MEDICINE

## 2023-04-05 PROCEDURE — 99214 OFFICE O/P EST MOD 30 MIN: CPT | Performed by: FAMILY MEDICINE

## 2023-04-05 RX ORDER — AMOXICILLIN 500 MG/1
1000 CAPSULE ORAL 2 TIMES DAILY
Qty: 40 CAPSULE | Refills: 0 | Status: SHIPPED | OUTPATIENT
Start: 2023-04-05

## 2023-04-05 RX ORDER — BENZONATATE 100 MG/1
CAPSULE ORAL
COMMUNITY
Start: 2023-04-03

## 2023-04-05 RX ORDER — FLUTICASONE PROPIONATE 50 MCG
SPRAY, SUSPENSION (ML) NASAL
COMMUNITY
Start: 2023-04-03

## 2023-04-05 RX ORDER — SERTRALINE HYDROCHLORIDE 25 MG/1
TABLET, FILM COATED ORAL
COMMUNITY
Start: 2023-03-08

## 2023-04-05 NOTE — PROGRESS NOTES
Office Note     Name: Zachary Adkins    : 1970     MRN: [id number]     Chief Complaint  Follow-up (Following up on medications.), Labs Only, Arthritis (Wants to have labs for arthritis and lupus. Her Grandmother had lupus.), Nasal Congestion (Seen at urgent treatment negative for covid and strep), and Headache    Subjective     History of Present Illness:  Zachary Adkins is a 52 y.o. female who presents today for follow-up on medicines, been hurting for months especially wrist and hands aching.  Her grandmother had lupus seen in urgent treatment center for 2 days ago told to take antibiotics but blowing yellow-tinged with the blood out of both nose.  She is doing well with  Cardiologist she is got him on bisoprolol fumarate 5 mg twice daily and Zoloft 25 daily.  I asked her how she is so sore, could not  pants to cook.  And interfering with her sleep.  I told her she is about to have fibromyalgia and getting sleep is more important and gradually increasing exercise    Review of Systems:   Review of Systems    Past Medical History:   Past Medical History:   Diagnosis Date   • Arthritis    • Breast lump     RIGHT   • Common migraine    • Elevated blood pressure reading    • Fracture     RT 5TH [facility] FRX   • Hypertension    • Kidney disorder     Born with 1 good kidney        Past Surgical History:   Past Surgical History:   Procedure Laterality Date   • BREAST BIOPSY Right    • ORIF FINGER FRACTURE     • TUBAL ABDOMINAL LIGATION Bilateral        Family History:   Family History   Problem Relation Age of Onset   • Cancer Mother    • Lung cancer Father    • Allergies Brother    • Lupus Paternal Grandmother 65   • Diabetes Other    • Depression Other    • Cancer Other    • Migraines Other    • Eczema Other    • Allergies Other        Social History:   Social History     Socioeconomic History   • Marital status: Single   Tobacco Use   • Smoking status: Never   • Smokeless tobacco: Never  "  Vaping Use   • Vaping Use: Never used   Substance and Sexual Activity   • Alcohol use: Not Currently   • Drug use: Yes     Types: Marijuana   • Sexual activity: Defer       Immunizations:   Immunization History   Administered Date(s) Administered   • COVID-19 (ЕКАТЕРИНА) 08/02/2021   • Tdap 07/21/2010        Medications:     Current Outpatient Medications:   •  benzonatate (TESSALON) 100 MG capsule, , Disp: , Rfl:   •  bisoprolol (ZEBeta) 5 MG tablet, Take 1 tablet by mouth Daily. (Patient taking differently: Take 1 tablet by mouth 2 (Two) Times a Day.), Disp: 30 tablet, Rfl: 5  •  cyclobenzaprine (FLEXERIL) 10 MG tablet, Take 1 tablet by mouth Every Night., Disp: 30 tablet, Rfl: 5  •  fluticasone (FLONASE) 50 MCG/ACT nasal spray, , Disp: , Rfl:   •  sertraline (ZOLOFT) 25 MG tablet, , Disp: , Rfl:   •  amoxicillin (AMOXIL) 500 MG capsule, Take 2 capsules by mouth 2 (Two) Times a Day., Disp: 40 capsule, Rfl: 0    Allergies:   No Known Allergies    Objective     Vital Signs  /84 (BP Location: Left arm, Patient Position: Sitting, Cuff Size: Adult)   Pulse 70   Resp 17   Ht 152.4 cm (60\")   Wt 81.2 kg (179 lb 1 oz)   SpO2 99%   BMI 34.97 kg/m²   Estimated body mass index is 34.97 kg/m² as calculated from the following:    Height as of this encounter: 152.4 cm (60\").    Weight as of this encounter: 81.2 kg (179 lb 1 oz).          Physical Exam  Vitals and nursing note reviewed.   Constitutional:       Appearance: She is obese.   HENT:      Head: Normocephalic and atraumatic.      Comments: Rt nares erythema.  Rt maxilary > left max.  ++ tender       Right Ear: Tympanic membrane, ear canal and external ear normal.      Left Ear: Tympanic membrane, ear canal and external ear normal.      Nose: Nose normal. No congestion or rhinorrhea.      Mouth/Throat:      Mouth: Mucous membranes are moist.   Eyes:      Extraocular Movements: Extraocular movements intact.      Conjunctiva/sclera: Conjunctivae normal.      " Pupils: Pupils are equal, round, and reactive to light.   Cardiovascular:      Rate and Rhythm: Normal rate and regular rhythm.   Pulmonary:      Effort: Pulmonary effort is normal.      Breath sounds: Normal breath sounds.   Lymphadenopathy:      Cervical: No cervical adenopathy.   Skin:     General: Skin is warm and dry.   Neurological:      General: No focal deficit present.      Mental Status: She is alert.          Procedures     Assessment and Plan     1. Essential hypertension  Controlled  - Comprehensive Metabolic Panel; Future    2. Localized osteoarthritis of hands, bilateral  She has a history of lupus  - Rheumatoid Factor; Future  - Sedimentation Rate; Future  - THA; Future  - C-reactive Protein; Future    3. Megaloblastic anemia  We will check on  - Vitamin B12 & Folate; Future    4. Other fatigue  We will check on  - CBC & Differential; Future  - TSH; Future    5. Acute maxillary sinusitis, recurrence not specified  Treat with Amoxil 500 mg 2 twice daily    6. Mixed hyperlipidemia  We will check on  - Lipid Panel; Future    7. Hypokalemia  We will check on  - Magnesium; Future    8. Reactive depression  25 Zoloft reserved right to go to 50 mg       Follow Up  No follow-ups on file.    Leighton SOUZA [facility]  [address] [place] [id number] [phone]  Answers for HPI/ROS submitted by the patient on 4/4/2023  Please describe your symptoms.: Joint pain & muscle pain, I've been having this a long time & now it's affecting my daily living., Also need to see if I have a sinus infection, woke up Monday, 4/3 w/ lots of drainage  Have you had these symptoms before?: Yes  How long have you been having these symptoms?: Greater than 2 weeks  Please list any medications you are currently taking for this condition.: Bisoprol Fum 5mg, Generic for Zebeta 2x a day, Sertraline 25mg, 1x a day  What is the primary reason for your visit?:  Other

## 2023-04-06 LAB
ALBUMIN SERPL-MCNC: 4.2 G/DL (ref 3.8–4.8)
ALBUMIN/GLOB SERPL: 1.4 {RATIO} (ref 1.2–2.2)
ALP SERPL-CCNC: 95 IU/L (ref 44–121)
ALT SERPL-CCNC: 35 IU/L (ref 0–32)
ANA SER QL: NEGATIVE
AST SERPL-CCNC: 28 IU/L (ref 0–40)
BASOPHILS # BLD AUTO: 0.1 X10E3/UL (ref 0–0.2)
BASOPHILS NFR BLD AUTO: 1 %
BILIRUB SERPL-MCNC: 0.2 MG/DL (ref 0–1.2)
BUN SERPL-MCNC: 10 MG/DL (ref 6–24)
BUN/CREAT SERPL: 10 (ref 9–23)
CALCIUM SERPL-MCNC: 9.3 MG/DL (ref 8.7–10.2)
CHLORIDE SERPL-SCNC: 104 MMOL/L (ref 96–106)
CHOLEST SERPL-MCNC: 194 MG/DL (ref 100–199)
CO2 SERPL-SCNC: 20 MMOL/L (ref 20–29)
CREAT SERPL-MCNC: 0.99 MG/DL (ref 0.57–1)
CRP SERPL-MCNC: 46 MG/L (ref 0–10)
EGFRCR SERPLBLD CKD-EPI 2021: 69 ML/MIN/1.73
EOSINOPHIL # BLD AUTO: 0.5 X10E3/UL (ref 0–0.4)
EOSINOPHIL NFR BLD AUTO: 6 %
ERYTHROCYTE [DISTWIDTH] IN BLOOD BY AUTOMATED COUNT: 14.5 % (ref 11.7–15.4)
ERYTHROCYTE [SEDIMENTATION RATE] IN BLOOD BY WESTERGREN METHOD: 23 MM/HR (ref 0–40)
FOLATE SERPL-MCNC: 8.5 NG/ML
GLOBULIN SER CALC-MCNC: 3 G/DL (ref 1.5–4.5)
GLUCOSE SERPL-MCNC: 90 MG/DL (ref 70–99)
HCT VFR BLD AUTO: 42.1 % (ref 34–46.6)
HDLC SERPL-MCNC: 30 MG/DL
HGB BLD-MCNC: 13.9 G/DL (ref 11.1–15.9)
IMM GRANULOCYTES # BLD AUTO: 0 X10E3/UL (ref 0–0.1)
IMM GRANULOCYTES NFR BLD AUTO: 0 %
LDLC SERPL CALC-MCNC: 126 MG/DL (ref 0–99)
LYMPHOCYTES # BLD AUTO: 1.9 X10E3/UL (ref 0.7–3.1)
LYMPHOCYTES NFR BLD AUTO: 20 %
MAGNESIUM SERPL-MCNC: 2.1 MG/DL (ref 1.6–2.3)
MCH RBC QN AUTO: 26.7 PG (ref 26.6–33)
MCHC RBC AUTO-ENTMCNC: 33 G/DL (ref 31.5–35.7)
MCV RBC AUTO: 81 FL (ref 79–97)
MONOCYTES # BLD AUTO: 0.8 X10E3/UL (ref 0.1–0.9)
MONOCYTES NFR BLD AUTO: 9 %
NEUTROPHILS # BLD AUTO: 6.1 X10E3/UL (ref 1.4–7)
NEUTROPHILS NFR BLD AUTO: 64 %
PLATELET # BLD AUTO: 314 X10E3/UL (ref 150–450)
POTASSIUM SERPL-SCNC: 4.4 MMOL/L (ref 3.5–5.2)
PROT SERPL-MCNC: 7.2 G/DL (ref 6–8.5)
RBC # BLD AUTO: 5.21 X10E6/UL (ref 3.77–5.28)
RHEUMATOID FACT SERPL-ACNC: <10 IU/ML
SODIUM SERPL-SCNC: 142 MMOL/L (ref 134–144)
TRIGL SERPL-MCNC: 215 MG/DL (ref 0–149)
TSH SERPL DL<=0.005 MIU/L-ACNC: 2.11 UIU/ML (ref 0.45–4.5)
VIT B12 SERPL-MCNC: 453 PG/ML (ref 232–1245)
VLDLC SERPL CALC-MCNC: 38 MG/DL (ref 5–40)
WBC # BLD AUTO: 9.5 X10E3/UL (ref 3.4–10.8)

## 2023-08-04 ENCOUNTER — OFFICE VISIT (OUTPATIENT)
Dept: FAMILY MEDICINE CLINIC | Facility: CLINIC | Age: 53
End: 2023-08-04
Payer: COMMERCIAL

## 2023-08-04 VITALS
HEART RATE: 77 BPM | TEMPERATURE: 97.5 F | OXYGEN SATURATION: 97 % | DIASTOLIC BLOOD PRESSURE: 72 MMHG | SYSTOLIC BLOOD PRESSURE: 112 MMHG | WEIGHT: 178 LBS | BODY MASS INDEX: 34.76 KG/M2

## 2023-08-04 DIAGNOSIS — M77.12 LATERAL EPICONDYLITIS OF BOTH ELBOWS: ICD-10-CM

## 2023-08-04 DIAGNOSIS — N92.1 MENOMETRORRHAGIA: Primary | ICD-10-CM

## 2023-08-04 DIAGNOSIS — M77.11 LATERAL EPICONDYLITIS OF BOTH ELBOWS: ICD-10-CM

## 2023-08-04 DIAGNOSIS — M79.7 FIBROMYALGIA: ICD-10-CM

## 2023-08-04 DIAGNOSIS — I10 ESSENTIAL HYPERTENSION: ICD-10-CM

## 2023-08-04 PROCEDURE — 99214 OFFICE O/P EST MOD 30 MIN: CPT | Performed by: FAMILY MEDICINE

## 2023-08-04 RX ORDER — SENNOSIDES 8.6 MG
2 CAPSULE ORAL EVERY 8 HOURS
COMMUNITY

## 2023-08-04 RX ORDER — CYCLOBENZAPRINE HCL 10 MG
10 TABLET ORAL NIGHTLY
Qty: 90 TABLET | Refills: 3 | Status: SHIPPED | OUTPATIENT
Start: 2023-08-04

## 2023-08-04 RX ORDER — PREGABALIN 75 MG/1
CAPSULE ORAL
COMMUNITY
Start: 2023-05-03

## 2023-08-04 RX ORDER — BISOPROLOL FUMARATE 5 MG/1
5 TABLET, FILM COATED ORAL 2 TIMES DAILY
Qty: 180 TABLET | Refills: 3 | Status: SHIPPED | OUTPATIENT
Start: 2023-08-04

## 2023-09-22 RX ORDER — BISOPROLOL FUMARATE 5 MG/1
5 TABLET, FILM COATED ORAL 2 TIMES DAILY
Qty: 180 TABLET | Refills: 3 | Status: SHIPPED | OUTPATIENT
Start: 2023-09-22

## 2023-09-22 NOTE — TELEPHONE ENCOUNTER
Caller: Zachary Adkins    Relationship: Self    Best call back number: [phone]     Requested Prescriptions:   Requested Prescriptions     Pending Prescriptions Disp Refills    bisoprolol (ZEBeta) 5 MG tablet 180 tablet 3     Sig: Take 1 tablet by mouth 2 (Two) Times a Day.        Pharm[facility] where request should be sent[facility] [phone] [facility] [phone]      Last office visit with prescribing clinician: 8/4/2023   Last telemedicine visit with prescribing clinician: Visit date not found   Next office visit with prescribing clinician: Visit date not found     Additional details provided by patient: PATIENT STATED PHARMACY DID NOT RECEIVE REFILL AND YOU THREE DAYS WORTH.    Does the patient have less than a 3 day supply:  [x] Yes  [] No    Would you like a call back once the refill request has been completed: [x] Yes [] No    If the office needs to give you a call back, can they leave a voicemail: [x] Yes [] No    Vasquez Eid Rep   09/22/23 08:25 EDT

## 2024-07-29 RX ORDER — CYCLOBENZAPRINE HCL 10 MG
10 TABLET ORAL EVERY EVENING
Qty: 30 TABLET | Refills: 0 | Status: SHIPPED | OUTPATIENT
Start: 2024-07-29

## 2024-09-23 ENCOUNTER — OFFICE VISIT (OUTPATIENT)
Dept: FAMILY MEDICINE CLINIC | Facility: CLINIC | Age: 54
End: 2024-09-23
Payer: COMMERCIAL

## 2024-09-23 VITALS
OXYGEN SATURATION: 98 % | DIASTOLIC BLOOD PRESSURE: 90 MMHG | WEIGHT: 188 LBS | BODY MASS INDEX: 36.91 KG/M2 | SYSTOLIC BLOOD PRESSURE: 146 MMHG | HEART RATE: 73 BPM | HEIGHT: 60 IN

## 2024-09-23 DIAGNOSIS — M89.8X1 PAIN OF LEFT CLAVICLE: ICD-10-CM

## 2024-09-23 DIAGNOSIS — R07.9 LEFT-SIDED CHEST PAIN: Primary | ICD-10-CM

## 2024-09-23 DIAGNOSIS — S29.011D STRAIN OF LEFT PECTORALIS MUSCLE, SUBSEQUENT ENCOUNTER: ICD-10-CM

## 2024-09-23 PROCEDURE — 99214 OFFICE O/P EST MOD 30 MIN: CPT | Performed by: FAMILY MEDICINE

## 2024-09-25 ENCOUNTER — TELEPHONE (OUTPATIENT)
Dept: FAMILY MEDICINE CLINIC | Facility: CLINIC | Age: 54
End: 2024-09-25

## 2024-09-25 RX ORDER — PREDNISONE 20 MG/1
TABLET ORAL
Qty: 18 TABLET | Refills: 0 | Status: SHIPPED | OUTPATIENT
Start: 2024-09-25

## 2024-09-25 NOTE — TELEPHONE ENCOUNTER
Provider: DR. BRAR    Caller: CROW    Relationship to Patient: SELF    Phone Number: [phone]     Reason for Call: PATIENT IS REQUESTING A CALL BACK TO DISCUSS THE RESULTS OF HER X-RAYS.    PLEASE CALL PATIENT TO DISCUSS ASAP

## 2024-09-26 NOTE — TELEPHONE ENCOUNTER
Pt contacted. With result to xray    Pt states she can't have prednisone due to only having 1 kidney.  Can't have anti inflammatory's.

## 2024-10-21 ENCOUNTER — OFFICE VISIT (OUTPATIENT)
Dept: FAMILY MEDICINE CLINIC | Facility: CLINIC | Age: 54
End: 2024-10-21
Payer: COMMERCIAL

## 2024-10-21 VITALS
RESPIRATION RATE: 17 BRPM | DIASTOLIC BLOOD PRESSURE: 78 MMHG | OXYGEN SATURATION: 97 % | HEART RATE: 81 BPM | SYSTOLIC BLOOD PRESSURE: 112 MMHG

## 2024-10-21 DIAGNOSIS — R07.9 LEFT-SIDED CHEST PAIN: Primary | ICD-10-CM

## 2024-10-21 DIAGNOSIS — M89.8X1 PAIN OF LEFT CLAVICLE: ICD-10-CM

## 2024-10-21 PROCEDURE — 99213 OFFICE O/P EST LOW 20 MIN: CPT | Performed by: FAMILY MEDICINE

## 2024-10-21 RX ORDER — SENNOSIDES 8.6 MG
650 CAPSULE ORAL EVERY 8 HOURS PRN
Qty: 180 TABLET | Refills: 2 | Status: SHIPPED | OUTPATIENT
Start: 2024-10-21

## 2024-10-21 RX ORDER — CYCLOBENZAPRINE HCL 10 MG
10 TABLET ORAL EVERY EVENING
Qty: 30 TABLET | Refills: 0 | Status: SHIPPED | OUTPATIENT
Start: 2024-10-21 | End: 2024-10-21 | Stop reason: SDUPTHER

## 2024-10-21 RX ORDER — CYCLOBENZAPRINE HCL 10 MG
10 TABLET ORAL EVERY EVENING
Qty: 30 TABLET | Refills: 2 | Status: SHIPPED | OUTPATIENT
Start: 2024-10-21
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
## 2024-12-17 ENCOUNTER — OFFICE VISIT (OUTPATIENT)
Dept: FAMILY MEDICINE CLINIC | Facility: CLINIC | Age: 54
End: 2024-12-17
Payer: COMMERCIAL

## 2024-12-17 VITALS
HEIGHT: 60 IN | SYSTOLIC BLOOD PRESSURE: 116 MMHG | DIASTOLIC BLOOD PRESSURE: 84 MMHG | HEART RATE: 75 BPM | WEIGHT: 187 LBS | OXYGEN SATURATION: 97 % | BODY MASS INDEX: 36.71 KG/M2

## 2024-12-17 DIAGNOSIS — R07.2 PRECORDIAL CHEST PAIN: Primary | ICD-10-CM

## 2024-12-17 DIAGNOSIS — M89.8X1 CLAVICLE PAIN: ICD-10-CM

## 2024-12-17 PROCEDURE — 99214 OFFICE O/P EST MOD 30 MIN: CPT | Performed by: FAMILY MEDICINE
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
## 2025-01-03 ENCOUNTER — TELEPHONE (OUTPATIENT)
Dept: FAMILY MEDICINE CLINIC | Facility: CLINIC | Age: 55
End: 2025-01-03
Payer: COMMERCIAL

## 2025-01-03 NOTE — TELEPHONE ENCOUNTER
bisoprolol (ZEBeta) 5 MG tablet   cyclobenzaprine (FLEXERIL) 10 MG tablet   [facility]

## 2025-01-07 RX ORDER — CYCLOBENZAPRINE HCL 10 MG
10 TABLET ORAL EVERY EVENING
Qty: 30 TABLET | Refills: 2 | Status: SHIPPED | OUTPATIENT
Start: 2025-01-07

## 2025-01-07 RX ORDER — BISOPROLOL FUMARATE 5 MG/1
5 TABLET, FILM COATED ORAL 2 TIMES DAILY
Qty: 180 TABLET | Refills: 3 | Status: SHIPPED | OUTPATIENT
Start: 2025-01-07

## 2025-08-04 RX ORDER — CYCLOBENZAPRINE HCL 10 MG
10 TABLET ORAL EVERY EVENING
Qty: 30 TABLET | Refills: 0 | Status: SHIPPED | OUTPATIENT
Start: 2025-08-04

## 2025-08-04 RX ORDER — BISOPROLOL FUMARATE 5 MG/1
5 TABLET, FILM COATED ORAL 2 TIMES DAILY
Qty: 180 TABLET | Refills: 0 | Status: SHIPPED | OUTPATIENT
Start: 2025-08-04